# Patient Record
Sex: MALE | Race: WHITE | NOT HISPANIC OR LATINO | Employment: FULL TIME | URBAN - NONMETROPOLITAN AREA
[De-identification: names, ages, dates, MRNs, and addresses within clinical notes are randomized per-mention and may not be internally consistent; named-entity substitution may affect disease eponyms.]

---

## 2017-04-18 ENCOUNTER — HOSPITAL ENCOUNTER (EMERGENCY)
Facility: HOSPITAL | Age: 37
Discharge: HOME | End: 2017-04-18
Attending: FAMILY MEDICINE | Admitting: FAMILY MEDICINE

## 2017-04-18 VITALS
SYSTOLIC BLOOD PRESSURE: 130 MMHG | WEIGHT: 228 LBS | DIASTOLIC BLOOD PRESSURE: 79 MMHG | OXYGEN SATURATION: 97 % | HEIGHT: 71 IN | TEMPERATURE: 97.7 F | HEART RATE: 62 BPM | RESPIRATION RATE: 20 BRPM | BODY MASS INDEX: 31.92 KG/M2

## 2017-04-18 DIAGNOSIS — L02.01 ABSCESS OF FACE: Primary | ICD-10-CM

## 2017-04-18 PROCEDURE — 99282 EMERGENCY DEPT VISIT SF MDM: CPT

## 2017-04-18 RX ORDER — SULFAMETHOXAZOLE AND TRIMETHOPRIM 800; 160 MG/1; MG/1
1 TABLET ORAL 2 TIMES DAILY
Qty: 20 TABLET | Refills: 0 | Status: SHIPPED | OUTPATIENT
Start: 2017-04-18

## 2017-04-19 NOTE — ED PROVIDER NOTES
Subjective   History of Present Illness  This is a 36-year-old gentleman who presents today complaining of abscess to his right side of his face below his lip.  He states it's been here for about 4 days started draining about 2 days ago after he opened it up.  She states that he has strained it daily since that time.  He states it is painful and swells up during the day and then drains out and not once he returns home from work.  Denies any fever or chills nausea or vomiting.   Review of Systems   Constitutional: Negative.    HENT: Negative.    Eyes: Negative.    Respiratory: Negative.    Cardiovascular: Negative.    Gastrointestinal: Negative.    Genitourinary: Negative.    Skin: Positive for wound.        Abscess to right side of face    Neurological: Negative.    Psychiatric/Behavioral: Negative.        History reviewed. No pertinent past medical history.    No Known Allergies    Past Surgical History:   Procedure Laterality Date   • HAND SURGERY         Family History   Problem Relation Age of Onset   • COPD Mother    • Cancer Mother    • No Known Problems Father        Social History     Social History   • Marital status:      Spouse name: N/A   • Number of children: N/A   • Years of education: N/A     Social History Main Topics   • Smoking status: Current Every Day Smoker     Packs/day: 1.00     Types: Cigarettes   • Smokeless tobacco: Never Used   • Alcohol use No   • Drug use: No      Comment: Former; x2 years clean   • Sexual activity: Not Asked     Other Topics Concern   • None     Social History Narrative   • None           Objective   Physical Exam   Constitutional: He appears well-developed and well-nourished.   Nursing note and vitals reviewed.  GEN: No acute distress  Head: Normocephalic, atraumatic  Eyes: Pupils equal round reactive to light  ENT: Posterior pharynx normal in appearance, oral mucosa is moist  Chest: Nontender to palpation  Cardiovascular: Regular rate  Lungs: Clear to  auscultation bilaterally  Abdomen: Soft, nontender, nondistended, no peritoneal signs  Extremities: No edema, normal appearance  Neuro: GCS 15  Psych: Mood and affect are appropriate  Skin abscess noted below the right side of his lower lip centimeter induration.  Surrounding tissue  warm and dry.  Abscess draining serous to purulent discharge.     Procedures         ED Course  ED Course                  MDM  Number of Diagnoses or Management Options  Diagnosis management comments: Differential diagnosis includes Abscess versus cellulitis.  Patient has previously accessed abscess and wound is draining.  Small amount of induration left.  She will continue drainage at home states will continue warm compresses and we will start on by mouth antibiotics.  He is advised to return to emergency room if any increase in swelling or edema to the face.  Patient is follow-up with his primary care provider or reevaluation.       Final diagnoses:   Abscess of face            Litzy Cruz, LISA  04/20/17 1158

## 2021-06-11 ENCOUNTER — APPOINTMENT (OUTPATIENT)
Dept: GENERAL RADIOLOGY | Facility: HOSPITAL | Age: 41
End: 2021-06-11

## 2021-06-11 ENCOUNTER — HOSPITAL ENCOUNTER (EMERGENCY)
Facility: HOSPITAL | Age: 41
Discharge: HOME | End: 2021-06-11
Attending: EMERGENCY MEDICINE | Admitting: EMERGENCY MEDICINE

## 2021-06-11 VITALS
HEIGHT: 71 IN | WEIGHT: 225.6 LBS | SYSTOLIC BLOOD PRESSURE: 150 MMHG | RESPIRATION RATE: 18 BRPM | BODY MASS INDEX: 31.58 KG/M2 | HEART RATE: 58 BPM | OXYGEN SATURATION: 97 % | TEMPERATURE: 98.3 F | DIASTOLIC BLOOD PRESSURE: 111 MMHG

## 2021-06-11 DIAGNOSIS — M25.562 ACUTE PAIN OF LEFT KNEE: Primary | ICD-10-CM

## 2021-06-11 PROCEDURE — 73562 X-RAY EXAM OF KNEE 3: CPT

## 2021-06-11 PROCEDURE — 99282 EMERGENCY DEPT VISIT SF MDM: CPT

## 2021-06-11 RX ORDER — PREDNISONE 20 MG/1
40 TABLET ORAL DAILY
Qty: 10 TABLET | Refills: 0 | Status: SHIPPED | OUTPATIENT
Start: 2021-06-11 | End: 2021-06-16

## 2021-06-12 NOTE — ED PROVIDER NOTES
Subjective   Chief Complaint: Left knee pain  History of Present Illness: 41-year-old male comes in for evaluation of above complaint.  He states over the past 2 weeks he has had intermittent swelling to his left anterior knee.  He denies any significant pain.  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  He does wear knee pads.  He denies any calf pain or tenderness.  No popliteal pain or tenderness.  No history of DVT.  No fever chills.  No systemic symptoms.  He states his left anterior knee was much more swollen a few days ago but it has gone down over the past few days.  He does state that he has had what he feels as though some swelling in his left ankle now with some discomfort or tightness radiating down his shin from his knee.  This is all anterior he has no posterior pain.  He did have a history of drug abuse but has been clean for over 7 years.  No recent injections into the knee.  Onset: 2 Weeks ago  Timing: Gradually worsening  Exacerbating / Alleviating factors: Nothing seems to make symptoms better or worse.  Associated symptoms: None      Nurses Notes reviewed and agree, including vitals, allergies, social history and prior medical history.          Review of Systems   Constitutional: Negative.    HENT: Negative.    Eyes: Negative.    Respiratory: Negative.    Cardiovascular: Negative.    Gastrointestinal: Negative.    Genitourinary: Negative.    Musculoskeletal:        Left anterior knee swelling with mild erythema   Skin: Negative.    Allergic/Immunologic: Negative.    Neurological: Negative.    Psychiatric/Behavioral: Negative.    All other systems reviewed and are negative.      Past Medical History:   Diagnosis Date   • GERD (gastroesophageal reflux disease)        No Known Allergies    Past Surgical History:   Procedure Laterality Date   • HAND SURGERY         Family History   Problem Relation Age of Onset   • COPD Mother    •  Cancer Mother    • No Known Problems Father        Social History     Socioeconomic History   • Marital status:      Spouse name: Not on file   • Number of children: Not on file   • Years of education: Not on file   • Highest education level: Not on file   Tobacco Use   • Smoking status: Former Smoker     Packs/day: 1.00     Types: Cigarettes   • Smokeless tobacco: Never Used   • Tobacco comment: quit 2 years ago   Substance and Sexual Activity   • Alcohol use: No   • Drug use: No     Comment: Former; x 7 years           Objective   Physical Exam  Vitals and nursing note reviewed.   Constitutional:       Appearance: Normal appearance.   HENT:      Head: Normocephalic and atraumatic.      Nose: Nose normal.   Eyes:      Extraocular Movements: Extraocular movements intact.   Cardiovascular:      Rate and Rhythm: Normal rate and regular rhythm.      Pulses: Normal pulses.   Pulmonary:      Effort: Pulmonary effort is normal.   Abdominal:      Palpations: Abdomen is soft.   Musculoskeletal:         General: Normal range of motion.      Cervical back: Normal range of motion.      Comments: There is some soft tissue swelling to the left anterior knee.  No cellulitis.  No calf pain or tenderness to the left lower extremity.  2+ DP pulse.  Very mild swelling to the left ankle.  Nontender.   Skin:     General: Skin is warm and dry.   Neurological:      General: No focal deficit present.      Mental Status: He is alert.   Psychiatric:         Behavior: Behavior normal.         Procedures           ED Course      Given the fact that the patient denies any pain in his knee I have a very low suspicion for septic arthritis.  He has no DVT stigmata.  Given his occupation a bursitis is the most likely cause of his symptoms.  We will have him follow-up with orthopedics.                                     MDM    Final diagnoses:   Acute pain of left knee       ED Disposition  ED Disposition     ED Disposition Condition  Comment    Discharge Stable           Rodrick Jose MD  [address] [facility]  [address]  [facility] [address] [phone]    Schedule an appointment as soon as possible for a visit            Medication List      ASK your doctor about these medications    predniSONE 20 MG tablet  Commonly known as: DELTASONE  Take 2 tablets by mouth Daily for 5 days.  Ask about: Should I take this medication?           Where to Get Your Medications      These medications were sent to [facility] [address] [place] [address] - [address] [facility] - [phone]  - [phone]   [address] [facility] [address]    Phone: [phone]   · predniSONE 20 MG tablet          Hansel Fox PA-C  06/11/21 6980       Hansel Fox PA-C  06/19/21 0059

## 2021-09-22 ENCOUNTER — LAB (OUTPATIENT)
Dept: LAB | Facility: HOSPITAL | Age: 41
End: 2021-09-22

## 2021-09-22 ENCOUNTER — TRANSCRIBE ORDERS (OUTPATIENT)
Dept: LAB | Facility: HOSPITAL | Age: 41
End: 2021-09-22

## 2021-09-22 DIAGNOSIS — Z20.822 COVID-19 RULED OUT: ICD-10-CM

## 2021-09-22 DIAGNOSIS — Z20.822 COVID-19 RULED OUT: Primary | ICD-10-CM

## 2021-09-22 LAB — SARS-COV-2 RNA NOSE QL NAA+PROBE: DETECTED

## 2021-09-22 PROCEDURE — U0004 COV-19 TEST NON-CDC HGH THRU: HCPCS

## 2021-09-23 ENCOUNTER — TELEPHONE (OUTPATIENT)
Dept: OTHER | Facility: HOSPITAL | Age: 41
End: 2021-09-23

## 2023-12-21 ENCOUNTER — PATIENT ROUNDING (BHMG ONLY) (OUTPATIENT)
Dept: FAMILY MEDICINE CLINIC | Facility: CLINIC | Age: 43
End: 2023-12-21
Payer: COMMERCIAL

## 2023-12-21 ENCOUNTER — OFFICE VISIT (OUTPATIENT)
Dept: FAMILY MEDICINE CLINIC | Facility: CLINIC | Age: 43
End: 2023-12-21
Payer: COMMERCIAL

## 2023-12-21 ENCOUNTER — LAB (OUTPATIENT)
Dept: FAMILY MEDICINE CLINIC | Facility: CLINIC | Age: 43
End: 2023-12-21
Payer: COMMERCIAL

## 2023-12-21 VITALS
HEART RATE: 61 BPM | BODY MASS INDEX: 31.92 KG/M2 | RESPIRATION RATE: 18 BRPM | HEIGHT: 71 IN | WEIGHT: 228 LBS | TEMPERATURE: 96.9 F | OXYGEN SATURATION: 95 % | DIASTOLIC BLOOD PRESSURE: 100 MMHG | SYSTOLIC BLOOD PRESSURE: 148 MMHG

## 2023-12-21 DIAGNOSIS — G56.03 BILATERAL CARPAL TUNNEL SYNDROME: ICD-10-CM

## 2023-12-21 DIAGNOSIS — G47.30 SLEEP APNEA, UNSPECIFIED TYPE: Primary | ICD-10-CM

## 2023-12-21 DIAGNOSIS — I10 PRIMARY HYPERTENSION: ICD-10-CM

## 2023-12-21 DIAGNOSIS — Z00.00 ENCOUNTER FOR MEDICAL EXAMINATION TO ESTABLISH CARE: ICD-10-CM

## 2023-12-21 LAB
ALBUMIN SERPL-MCNC: 4.7 G/DL (ref 3.5–5.2)
ALBUMIN/GLOB SERPL: 1.7 G/DL
ALP SERPL-CCNC: 51 U/L (ref 39–117)
ALT SERPL W P-5'-P-CCNC: 74 U/L (ref 1–41)
ANION GAP SERPL CALCULATED.3IONS-SCNC: 12.9 MMOL/L (ref 5–15)
AST SERPL-CCNC: 63 U/L (ref 1–40)
BILIRUB SERPL-MCNC: 0.9 MG/DL (ref 0–1.2)
BUN SERPL-MCNC: 15 MG/DL (ref 6–20)
BUN/CREAT SERPL: 12.2 (ref 7–25)
CALCIUM SPEC-SCNC: 10 MG/DL (ref 8.6–10.5)
CHLORIDE SERPL-SCNC: 105 MMOL/L (ref 98–107)
CO2 SERPL-SCNC: 24.1 MMOL/L (ref 22–29)
CREAT SERPL-MCNC: 1.23 MG/DL (ref 0.76–1.27)
EGFRCR SERPLBLD CKD-EPI 2021: 74.2 ML/MIN/1.73
GLOBULIN UR ELPH-MCNC: 2.7 GM/DL
GLUCOSE SERPL-MCNC: 84 MG/DL (ref 65–99)
POTASSIUM SERPL-SCNC: 3.7 MMOL/L (ref 3.5–5.2)
PROT SERPL-MCNC: 7.4 G/DL (ref 6–8.5)
SODIUM SERPL-SCNC: 142 MMOL/L (ref 136–145)

## 2023-12-21 PROCEDURE — 36415 COLL VENOUS BLD VENIPUNCTURE: CPT | Performed by: PHYSICIAN ASSISTANT

## 2023-12-21 PROCEDURE — 80053 COMPREHEN METABOLIC PANEL: CPT | Performed by: PHYSICIAN ASSISTANT

## 2023-12-21 RX ORDER — LISINOPRIL 20 MG/1
20 TABLET ORAL DAILY
Qty: 30 TABLET | Refills: 6 | Status: SHIPPED | OUTPATIENT
Start: 2023-12-21

## 2023-12-21 NOTE — PROGRESS NOTES
[facility] a My-Chart message has been sent to the patient for PATIENT ROUNDING with a My chart message

## 2023-12-21 NOTE — PROGRESS NOTES
Subjective   Reed Lieberman is a 43 y.o. male  NEW PATIENT, Insomnia, Blurred Vision, and Headache      History of Present Illness  Patient is a pleasant 43-year-old male who comes in with multiple complaints first is high blood pressure blood pressure elevated 148/100 patient states that he had a headache with blurred vision dizziness has been going on for last couple weeks    Patient complains of snoring at night severely, patient's wife states that he stops breathing at night.  Patient does not feel rested when he wakes up in the morning has daytime somnolence and falls asleep easily while watching TV.  Has fatigue    Patient complains of bilateral hand numbness weakness numbness in his fingers he does shalom for living wakes up with his hands feeling numbness weakness chronic issue  The following portions of the patient's history were reviewed and updated as appropriate: allergies, current medications, past social history and problem list    Review of Systems   Constitutional:  Negative for fatigue and unexpected weight change.   Respiratory:  Negative for cough, chest tightness and shortness of breath.    Cardiovascular:  Negative for chest pain, palpitations and leg swelling.   Gastrointestinal:  Negative for nausea.   Musculoskeletal:  Positive for arthralgias.   Skin:  Negative for color change and rash.   Neurological:  Positive for weakness, numbness and headaches. Negative for dizziness and syncope.       Objective     Vitals:    12/21/23 1055   BP: 148/100   Pulse: 61   Resp: 18   Temp: 96.9 °F (36.1 °C)   SpO2: 95%       Physical Exam  Vitals and nursing note reviewed.   Constitutional:       General: He is not in acute distress.     Appearance: Normal appearance. He is well-developed. He is not ill-appearing, toxic-appearing or diaphoretic.   Neck:      Vascular: No carotid bruit or JVD.   Cardiovascular:      Rate and Rhythm: Normal rate and regular rhythm.      Pulses: Normal pulses.      Heart  sounds: Normal heart sounds. No murmur heard.  Pulmonary:      Effort: Pulmonary effort is normal. No respiratory distress.      Breath sounds: Normal breath sounds.   Abdominal:      Palpations: Abdomen is soft.      Tenderness: There is no abdominal tenderness.   Musculoskeletal:      Right wrist: Tenderness present.      Left wrist: Tenderness present.      Comments: Positive Tinel's test bilaterally positive Phalen's test   Skin:     General: Skin is warm and dry.   Neurological:      Mental Status: He is alert.         Assessment & Plan     Diagnoses and all orders for this visit:    1. Sleep apnea, unspecified type (Primary)  -     Ambulatory Referral to Sleep Medicine    2. Primary hypertension  -     Comprehensive Metabolic Panel; Future  -     lisinopril (PRINIVIL,ZESTRIL) 20 MG tablet; Take 1 tablet by mouth Daily.  Dispense: 30 tablet; Refill: 6  -     Comprehensive Metabolic Panel    3. Bilateral carpal tunnel syndrome  -     EMG & Nerve Conduction Test; Future     I spent 15 minutes in patient care: Reviewing records prior to the visit, examining the patient, entering orders and documentation    Part of this note may be an electronic transcription/translation of spoken language to printed text using the Dragon Dictation System.

## 2023-12-29 ENCOUNTER — TELEPHONE (OUTPATIENT)
Dept: FAMILY MEDICINE CLINIC | Facility: CLINIC | Age: 43
End: 2023-12-29

## 2023-12-29 NOTE — TELEPHONE ENCOUNTER
"Per scheduling: Referral for EMG testing class needs to be changed from \"clinic performed\" to \"hospital performed\" in order to get this scheduled with [facility], please.   "

## 2024-01-02 DIAGNOSIS — G56.03 BILATERAL CARPAL TUNNEL SYNDROME: Primary | ICD-10-CM

## 2024-03-12 ENCOUNTER — OFFICE VISIT (OUTPATIENT)
Dept: PULMONOLOGY | Facility: CLINIC | Age: 44
End: 2024-03-12

## 2024-03-12 VITALS
OXYGEN SATURATION: 95 % | HEIGHT: 71 IN | DIASTOLIC BLOOD PRESSURE: 78 MMHG | RESPIRATION RATE: 14 BRPM | HEART RATE: 67 BPM | BODY MASS INDEX: 31.92 KG/M2 | WEIGHT: 228 LBS | SYSTOLIC BLOOD PRESSURE: 124 MMHG

## 2024-03-12 DIAGNOSIS — R06.83 SNORING: ICD-10-CM

## 2024-03-12 DIAGNOSIS — G47.19 EXCESSIVE DAYTIME SLEEPINESS: ICD-10-CM

## 2024-03-12 DIAGNOSIS — E66.9 OBESITY (BMI 30-39.9): ICD-10-CM

## 2024-03-12 DIAGNOSIS — G47.33 OBSTRUCTIVE SLEEP APNEA: Primary | ICD-10-CM

## 2024-03-12 DIAGNOSIS — G47.52 DREAM ENACTMENT BEHAVIOR: ICD-10-CM

## 2024-03-12 PROCEDURE — 99244 OFF/OP CNSLTJ NEW/EST MOD 40: CPT | Performed by: INTERNAL MEDICINE

## 2024-03-12 NOTE — PROGRESS NOTES
CONSULT NOTE    Requested by:   Gordon Conteh,*   Gordon Conteh, PA      Chief Complaint   Patient presents with    Consult    Sleeping Problem       Subjective:  Raj Lieberman is a 43 y.o. male.   Patient came in today for evaluation of possible sleep apnea. Patient says that for the past few years he snores loudly     he has woken up in the middle of the night gasping for breath and sometimes with a choking sensation. Also, the patient's family notes that he has occasional pauses in the breathing.     he feels that he doesn't get restful night sleep and his quality has diminished considerably. he does feel sleepy watching TV and reading a book.      he is complaining of occasional headaches.     Patient mentions having occasional nights when he sleep talks & he act out his dreams.     There is no known family history of sleep apnea    his Epsworth Sleepiness score was 15 /24.     Patient suffers from hypertension.      he drinks 2-3 cups/cans of caffeinated drinks per day.      The following portions of the patient's history were reviewed and updated as appropriate: allergies, current medications, past family history, past medical history, past social history, and past surgical history.    Review of Systems   HENT:  Negative for sinus pressure, sneezing and sore throat.    Respiratory:  Negative for cough, chest tightness, shortness of breath and wheezing.    Cardiovascular:  Negative for palpitations and leg swelling.   Psychiatric/Behavioral:  Positive for sleep disturbance.    All other systems reviewed and are negative.      Past Medical History:   Diagnosis Date    Arthritis     GERD (gastroesophageal reflux disease)     Headache        Social History     Tobacco Use    Smoking status: Former     Current packs/day: 0.00     Average packs/day: 1 pack/day for 25.0 years (25.0 ttl pk-yrs)     Types: Cigarettes     Start date:      Quit date: 2018     Years since quittin.1    Smokeless  "tobacco: Never    Tobacco comments:     quit 2 years ago   Substance Use Topics    Alcohol use: No         Objective:  Visit Vitals  /78   Pulse 67   Resp 14   Ht 180.3 cm (71\") Comment: pt reported   Wt 103 kg (228 lb)   SpO2 95%   BMI 31.80 kg/m²       Physical Exam  Vitals reviewed.   Constitutional:       Appearance: He is well-developed.   HENT:      Head: Atraumatic.      Mouth/Throat:      Comments: Oropharynx was crowded.  Musculoskeletal:      Comments: Gait was normal.   Neurological:      Mental Status: He is alert and oriented to person, place, and time.           Assessment/Plan:  Diagnoses and all orders for this visit:    1. Obstructive sleep apnea (Primary)    2. Snoring    3. Excessive daytime sleepiness    4. Obesity (BMI 30-39.9)    5. Dream enactment behavior        Return in about 5 months (around 8/12/2024) for [facility].    DISCUSSION(if any):  Laboratory workup was also reviewed which showed   Lab Results   Component Value Date    CO2 24.1 12/21/2023     Creatinine was 1.23.  Sodium was 142.    Referring provider note was also reviewed that did mention daytime sleepiness, fatigue & possible sleep apnea    ===========================  ===========================    Sleep questionnaire was reviewed with the patient    The pathophysiology of sleep apnea was discussed, with the patient.     We will encourage him to schedule the sleep study soon.     The patient was made aware of the limitation of the home sleep study, whereby it may underestimate the true AHI and also carries a low sensitivity.  I have informed him that even if the home sleep study is negative, we may suggest an in lab sleep study to completely and definitively rule out/in sleep apnea.  The patient has understood.  This was communicated to the patient, in case home study is to be requested.    The patient is agreeable to try CPAP/BiPAP, if needed.     Patient was educated on good sleep hygiene measures and voiced " understanding of the same.     Patient was given reading material regarding sleep apnea        Dictated utilizing Dragon dictation.    This document was electronically signed by Ana María Rodriguez MD on 03/12/24 at 13:18 EDT

## 2024-03-13 ENCOUNTER — PATIENT ROUNDING (BHMG ONLY) (OUTPATIENT)
Dept: PULMONOLOGY | Facility: CLINIC | Age: 44
End: 2024-03-13